# Patient Record
Sex: FEMALE | Race: WHITE | NOT HISPANIC OR LATINO | URBAN - METROPOLITAN AREA
[De-identification: names, ages, dates, MRNs, and addresses within clinical notes are randomized per-mention and may not be internally consistent; named-entity substitution may affect disease eponyms.]

---

## 2023-01-25 ENCOUNTER — APPOINTMENT (RX ONLY)
Dept: URBAN - METROPOLITAN AREA CLINIC 55 | Facility: CLINIC | Age: 33
Setting detail: DERMATOLOGY
End: 2023-01-25

## 2023-01-25 DIAGNOSIS — Z41.9 ENCOUNTER FOR PROCEDURE FOR PURPOSES OTHER THAN REMEDYING HEALTH STATE, UNSPECIFIED: ICD-10-CM

## 2023-01-25 PROCEDURE — ? BOTOX

## 2023-01-25 PROCEDURE — ? COSMETIC CONSULTATION: GENERAL

## 2023-01-25 PROCEDURE — ? INVENTORY

## 2023-01-25 NOTE — PROCEDURE: BOTOX
Nasal Root Units: 0
Show Right And Left Pupillary Line Units: No
Show Levator Superior Units: Yes
Incrementing Botox Units: By 0.5 Units
Post-Care Instructions: Patient instructed to not lie down for 4 hours and limit physical activity for 24 hours.
Additional Area 1 Location: Lip
Show Inventory Tab: Hide
Forehead Units: 8
Glabellar Complex Units: 16
Dilution (U/0.1 Cc): 4
Additional Area 2 Location: Nasalis
Consent obtained. Risks include but not limited to lid/brow ptosis, bruising, swelling, diplopia, temporary effect, incomplete chemical denervation.
Periorbital Skin Units: 12
Detail Level: Detailed

## 2023-02-03 ENCOUNTER — APPOINTMENT (RX ONLY)
Dept: URBAN - METROPOLITAN AREA CLINIC 55 | Facility: CLINIC | Age: 33
Setting detail: DERMATOLOGY
End: 2023-02-03

## 2023-02-03 DIAGNOSIS — Z41.9 ENCOUNTER FOR PROCEDURE FOR PURPOSES OTHER THAN REMEDYING HEALTH STATE, UNSPECIFIED: ICD-10-CM

## 2023-02-03 PROCEDURE — ? FILLERS

## 2023-02-03 PROCEDURE — ? BOTOX

## 2023-02-03 PROCEDURE — ? INVENTORY

## 2023-02-03 NOTE — PROCEDURE: BOTOX
Lcl Root Units: 0
Show Lcl Units: No
Show Inventory Tab: Hide
Show Additional Area 4: Yes
Forehead Units: 2
Consent obtained. Risks include but not limited to lid/brow ptosis, bruising, swelling, diplopia, temporary effect, incomplete chemical denervation.
Dilution (U/0.1 Cc): 4
Post-Care Instructions: Patient instructed to not lie down for 4 hours and limit physical activity for 24 hours.
Detail Level: Detailed
Additional Area 1 Location: Lip
Incrementing Botox Units: By 0.5 Units
Additional Area 2 Location: Nasalis

## 2023-02-03 NOTE — PROCEDURE: FILLERS
Marionette Lines Filler Volume In Cc: 0
Include Documentation That Aspiration Was Performed Prior To Injecting Filler:: No
Include Cannula Size?: 27G
Post-Care Instructions: After the procedure, patient instructed to apply ice to reduce swelling.
Aspiration Statement: Aspiration was performed prior to injecting site with filler.
Filler: Restylane Kysse
Detail Level: Detailed
Anesthesia Type: 1% lidocaine with epinephrine
Anesthesia Volume In Cc: 0.5
Include Cannula Information In Note?: Yes
Map Statment: See Attach Map for Complete Details
Consent: Written consent obtained. Risks include but not limited to bruising, beading, irregular texture, ulceration, infection, allergic reaction, scar formation, incomplete augmentation, temporary nature, and procedural pain.
Additional Anesthesia Volume In Cc: 6

## 2023-02-17 ENCOUNTER — APPOINTMENT (RX ONLY)
Dept: URBAN - METROPOLITAN AREA CLINIC 55 | Facility: CLINIC | Age: 33
Setting detail: DERMATOLOGY
End: 2023-02-17

## 2023-02-17 DIAGNOSIS — Z41.9 ENCOUNTER FOR PROCEDURE FOR PURPOSES OTHER THAN REMEDYING HEALTH STATE, UNSPECIFIED: ICD-10-CM

## 2023-02-17 PROCEDURE — ? INVENTORY

## 2023-02-17 PROCEDURE — ? IN-HOUSE DISPENSING PHARMACY

## 2023-02-17 PROCEDURE — ? COSMETIC CONSULTATION: GENERAL

## 2023-02-17 NOTE — PROCEDURE: IN-HOUSE DISPENSING PHARMACY
Product 74 Amount/Unit (Numbers Only): 0
Product 74 Unit Type: mg
Product 5 Unit Type: ml
Product 6 Application Directions: Apply nightly or as directed. Use SPF daily.
Product 7 Units Dispensed: 1
Name Of Product 5: Hydrating Tretinoin 0.025% Cream
Product 4 Amount/Unit (Numbers Only): 30
Name Of Product 2: Dapsone / Spironolactone Gel
Product 7 Application Directions: Apply only as directed
Render Product Pricing In Note: Yes
Send Charges To Patient Encounter: No
Name Of Product 1: Anti-Aging Brightening Cream
Product 6 Refills: 11
Detail Level: Zone
Product 7 Refills: 2
Name Of Product 3: Acne Triple Combination Gel
Name Of Product 7: Lidocaine 23% / Tetracaine 7% Cream
Name Of Product 4: Hydroquinone 8% Emulsion
Name Of Product 6: Rosacea Triple Combination Gel
Product 2 Application Directions: Apply nightly or as directed.

## 2023-03-21 ENCOUNTER — APPOINTMENT (RX ONLY)
Dept: URBAN - METROPOLITAN AREA CLINIC 55 | Facility: CLINIC | Age: 33
Setting detail: DERMATOLOGY
End: 2023-03-21

## 2023-03-21 DIAGNOSIS — Z41.9 ENCOUNTER FOR PROCEDURE FOR PURPOSES OTHER THAN REMEDYING HEALTH STATE, UNSPECIFIED: ICD-10-CM

## 2023-03-21 PROCEDURE — ? INVENTORY

## 2023-03-21 PROCEDURE — ? FRAXEL

## 2023-03-21 NOTE — PROCEDURE: FRAXEL
Wavelength: 1927nm
Indication: photodamage
Number Of Passes: 1
Anesthesia Type: 1% lidocaine with epinephrine
Medium Plastic Eye Shield Text: The ocular mucosa was anesthetized with tetracaine. Once adequate anesthesia was optained, medium plastic eye shields were inserted and remained in place until the procedure was completed.
Post-Care Instructions: Topical anesthetic removed with dry gauze. Skin cleansed with gentle cleanser and prepped with Hibiclens. \\nAlastin Regenerating Skin Nectar and physical sunblock applied post treatment. Ice packs sent home with patient. \\nI reviewed with the patient in detail post-care instructions. Patient should avoid sun until area fully healed.
Small Metal Eye Shield Text: The ocular mucosa was anesthetized with tetracaine. Once adequate anesthesia was optained, small metal eye shields were inserted and remained in place until the procedure was completed.
Treatment Level: 3
Large Plastic Eye Shield Text: The ocular mucosa was anesthetized with tetracaine. Once adequate anesthesia was optained, large plastic eye shields were inserted and remained in place until the procedure was completed.
Energy(Mj/Cm2): 20
Medium Metal Eye Shield Text: The ocular mucosa was anesthetized with tetracaine. Once adequate anesthesia was optained, medium metal eye shields were inserted and remained in place until the procedure was completed.
Large Metal Eye Shield Text: The ocular mucosa was anesthetized with tetracaine. Once adequate anesthesia was optained, large metal eye shields were inserted and remained in place until the procedure was completed.
Location: lower face
External Cooling Fan Speed: 5
Number Of Passes: 4
Detail Level: Zone
Small Plastic Eye Shield Text: The ocular mucosa was anesthetized with tetracaine. Once adequate anesthesia was optained, small plastic eye shields were inserted and remained in place until the procedure was completed.
Consent obtained, risks reviewed.
Was An Eye Shield Used?: No
Number Of Passes: 8

## 2023-04-03 ENCOUNTER — APPOINTMENT (RX ONLY)
Dept: URBAN - METROPOLITAN AREA CLINIC 55 | Facility: CLINIC | Age: 33
Setting detail: DERMATOLOGY
End: 2023-04-03

## 2023-04-03 DIAGNOSIS — Z41.9 ENCOUNTER FOR PROCEDURE FOR PURPOSES OTHER THAN REMEDYING HEALTH STATE, UNSPECIFIED: ICD-10-CM

## 2023-04-03 PROCEDURE — ? INVENTORY

## 2023-07-28 ENCOUNTER — APPOINTMENT (RX ONLY)
Dept: URBAN - METROPOLITAN AREA CLINIC 55 | Facility: CLINIC | Age: 33
Setting detail: DERMATOLOGY
End: 2023-07-28

## 2023-07-28 DIAGNOSIS — Z41.9 ENCOUNTER FOR PROCEDURE FOR PURPOSES OTHER THAN REMEDYING HEALTH STATE, UNSPECIFIED: ICD-10-CM

## 2023-07-28 PROCEDURE — ? INVENTORY

## 2023-07-28 PROCEDURE — ? BOTOX

## 2023-07-28 NOTE — PROCEDURE: BOTOX
Levator Labii Superioris Units: 0
Show Anterior Platysmal Band Units: Yes
Forehead Units: 10
Additional Area 2 Location: Nasalis
Show Ucl Units: No
Additional Area 3 Location: gummy smile
Incrementing Botox Units: By 0.5 Units
Periorbital Skin Units: 12
Show Inventory Tab: Hide
Post-Care Instructions: Patient instructed to not lie down for 4 hours and limit physical activity for 24 hours.
Dilution (U/0.1 Cc): 4
Additional Area 1 Location: Lip
Consent obtained. Risks include but not limited to lid/brow ptosis, bruising, swelling, diplopia, temporary effect, incomplete chemical denervation.
Detail Level: Detailed
Glabellar Complex Units: 16

## 2024-03-15 ENCOUNTER — APPOINTMENT (RX ONLY)
Dept: URBAN - METROPOLITAN AREA CLINIC 55 | Facility: CLINIC | Age: 34
Setting detail: DERMATOLOGY
End: 2024-03-15

## 2024-03-15 DIAGNOSIS — Z41.9 ENCOUNTER FOR PROCEDURE FOR PURPOSES OTHER THAN REMEDYING HEALTH STATE, UNSPECIFIED: ICD-10-CM

## 2024-03-15 PROCEDURE — ? COSMETIC CONSULTATION: GENERAL

## 2024-03-15 PROCEDURE — ? INVENTORY

## 2024-03-15 PROCEDURE — ? DYSPORT

## 2024-03-15 NOTE — PROCEDURE: DYSPORT
Right Pupillary Line Units: 0
Additional Area 2 Location: DLI
Show Orbicularis Oculi Units: Yes
Show Right And Left Periorbital Units: No
Post-Care Instructions: Patient instructed to not lie down for 4 hours and limit physical activity for 24 hours.
Consent obtained. Risks include but not limited to lid/brow ptosis, bruising, swelling, diplopia, temporary effect, incomplete chemical denervation.
Show Inventory Tab: Show
Additional Area 3 Location: Nasalis
Periorbital Skin Units: 40
Dilution (U/0.1 Cc): 10
Forehead Units: 25
Additional Area 1 Location: lip
Glabellar Complex Units: 30
Detail Level: Detailed